# Patient Record
Sex: MALE | Race: WHITE | NOT HISPANIC OR LATINO | Employment: PART TIME | URBAN - NONMETROPOLITAN AREA
[De-identification: names, ages, dates, MRNs, and addresses within clinical notes are randomized per-mention and may not be internally consistent; named-entity substitution may affect disease eponyms.]

---

## 2017-05-09 RX ORDER — LANSOPRAZOLE 30 MG/1
15 CAPSULE, DELAYED RELEASE ORAL DAILY
COMMUNITY
End: 2020-02-24

## 2017-05-09 RX ORDER — VALSARTAN AND HYDROCHLOROTHIAZIDE 320; 25 MG/1; MG/1
1 TABLET, FILM COATED ORAL DAILY
Status: ON HOLD | COMMUNITY
End: 2020-03-24 | Stop reason: CLARIF

## 2017-05-12 ENCOUNTER — ANESTHESIA EVENT (OUTPATIENT)
Dept: GASTROENTEROLOGY | Facility: HOSPITAL | Age: 52
End: 2017-05-12

## 2017-05-12 ENCOUNTER — HOSPITAL ENCOUNTER (OUTPATIENT)
Facility: HOSPITAL | Age: 52
Setting detail: HOSPITAL OUTPATIENT SURGERY
Discharge: HOME | End: 2017-05-12
Attending: INTERNAL MEDICINE | Admitting: INTERNAL MEDICINE

## 2017-05-12 ENCOUNTER — ANESTHESIA (OUTPATIENT)
Dept: GASTROENTEROLOGY | Facility: HOSPITAL | Age: 52
End: 2017-05-12

## 2017-05-12 VITALS
RESPIRATION RATE: 18 BRPM | HEART RATE: 102 BPM | BODY MASS INDEX: 30.02 KG/M2 | OXYGEN SATURATION: 98 % | WEIGHT: 209.66 LBS | TEMPERATURE: 97.8 F | DIASTOLIC BLOOD PRESSURE: 88 MMHG | HEIGHT: 70 IN | SYSTOLIC BLOOD PRESSURE: 131 MMHG

## 2017-05-12 PROCEDURE — 25010000002 PROPOFOL 10 MG/ML EMULSION: Performed by: NURSE ANESTHETIST, CERTIFIED REGISTERED

## 2017-05-12 PROCEDURE — 25010000002 MIDAZOLAM PER 1 MG: Performed by: NURSE ANESTHETIST, CERTIFIED REGISTERED

## 2017-05-12 RX ORDER — PROPOFOL 10 MG/ML
VIAL (ML) INTRAVENOUS AS NEEDED
Status: DISCONTINUED | OUTPATIENT
Start: 2017-05-12 | End: 2017-05-12 | Stop reason: SURG

## 2017-05-12 RX ORDER — MIDAZOLAM HYDROCHLORIDE 1 MG/ML
INJECTION INTRAMUSCULAR; INTRAVENOUS AS NEEDED
Status: DISCONTINUED | OUTPATIENT
Start: 2017-05-12 | End: 2017-05-12 | Stop reason: SURG

## 2017-05-12 RX ORDER — COLCHICINE 0.6 MG/1
0.6 TABLET ORAL 3 TIMES DAILY PRN
COMMUNITY
End: 2020-02-24

## 2017-05-12 RX ORDER — DEXTROSE AND SODIUM CHLORIDE 5; .45 G/100ML; G/100ML
30 INJECTION, SOLUTION INTRAVENOUS CONTINUOUS
Status: DISCONTINUED | OUTPATIENT
Start: 2017-05-12 | End: 2017-05-12 | Stop reason: HOSPADM

## 2017-05-12 RX ADMIN — PROPOFOL 70 MG: 10 INJECTION, EMULSION INTRAVENOUS at 14:41

## 2017-05-12 RX ADMIN — PROPOFOL 40 MG: 10 INJECTION, EMULSION INTRAVENOUS at 14:43

## 2017-05-12 RX ADMIN — PROPOFOL 20 MG: 10 INJECTION, EMULSION INTRAVENOUS at 14:45

## 2017-05-12 RX ADMIN — DEXTROSE AND SODIUM CHLORIDE 30 ML/HR: 5; 450 INJECTION, SOLUTION INTRAVENOUS at 14:08

## 2017-05-12 RX ADMIN — MIDAZOLAM 2 MG: 1 INJECTION INTRAMUSCULAR; INTRAVENOUS at 14:42

## 2017-05-12 RX ADMIN — PROPOFOL 40 MG: 10 INJECTION, EMULSION INTRAVENOUS at 14:42

## 2017-05-12 RX ADMIN — PROPOFOL 30 MG: 10 INJECTION, EMULSION INTRAVENOUS at 14:47

## 2020-02-17 ENCOUNTER — TELEPHONE (OUTPATIENT)
Dept: CARDIOLOGY | Facility: CLINIC | Age: 55
End: 2020-02-17

## 2020-02-17 NOTE — TELEPHONE ENCOUNTER
Called patient to see if he had seen another cardiologist for medical record purposes, there was no answer

## 2020-02-24 ENCOUNTER — OFFICE VISIT (OUTPATIENT)
Dept: CARDIOLOGY | Facility: CLINIC | Age: 55
End: 2020-02-24

## 2020-02-24 VITALS
HEIGHT: 70 IN | OXYGEN SATURATION: 98 % | WEIGHT: 211.6 LBS | DIASTOLIC BLOOD PRESSURE: 78 MMHG | HEART RATE: 80 BPM | BODY MASS INDEX: 30.29 KG/M2 | SYSTOLIC BLOOD PRESSURE: 130 MMHG

## 2020-02-24 DIAGNOSIS — I10 HYPERTENSION, UNSPECIFIED TYPE: Primary | ICD-10-CM

## 2020-02-24 DIAGNOSIS — E78.2 HYPERLIPEMIA, MIXED: ICD-10-CM

## 2020-02-24 DIAGNOSIS — I10 HYPERTENSION, ESSENTIAL: ICD-10-CM

## 2020-02-24 DIAGNOSIS — R07.9 CHEST PAIN, UNSPECIFIED TYPE: Primary | ICD-10-CM

## 2020-02-24 PROCEDURE — 99204 OFFICE O/P NEW MOD 45 MIN: CPT | Performed by: INTERNAL MEDICINE

## 2020-02-24 PROCEDURE — 93000 ELECTROCARDIOGRAM COMPLETE: CPT | Performed by: INTERNAL MEDICINE

## 2020-02-24 RX ORDER — TIZANIDINE 4 MG/1
TABLET ORAL
COMMUNITY
Start: 2020-01-07

## 2020-02-24 RX ORDER — AMLODIPINE BESYLATE 5 MG/1
5 TABLET ORAL EVERY EVENING
COMMUNITY
Start: 2020-02-02

## 2020-02-24 RX ORDER — HYDROCHLOROTHIAZIDE 25 MG/1
25 TABLET ORAL DAILY
COMMUNITY
Start: 2019-12-26 | End: 2020-02-24

## 2020-02-24 RX ORDER — IRBESARTAN AND HYDROCHLOROTHIAZIDE 300; 12.5 MG/1; MG/1
1 TABLET, FILM COATED ORAL DAILY
COMMUNITY
Start: 2020-01-24 | End: 2020-02-24

## 2020-02-24 RX ORDER — LOSARTAN POTASSIUM 100 MG/1
100 TABLET ORAL DAILY
Status: ON HOLD | COMMUNITY
Start: 2019-11-23 | End: 2020-03-24 | Stop reason: CLARIF

## 2020-02-24 RX ORDER — ATORVASTATIN CALCIUM 20 MG/1
20 TABLET, FILM COATED ORAL DAILY
Qty: 30 TABLET | Refills: 11 | Status: SHIPPED | OUTPATIENT
Start: 2020-02-24 | End: 2020-07-22

## 2020-02-24 RX ORDER — MELOXICAM 7.5 MG/1
7.5 TABLET ORAL DAILY
COMMUNITY
Start: 2020-01-25 | End: 2021-11-24

## 2020-02-24 RX ORDER — ATORVASTATIN CALCIUM 10 MG/1
10 TABLET, FILM COATED ORAL EVERY EVENING
COMMUNITY
Start: 2020-02-22 | End: 2020-02-24

## 2020-02-24 NOTE — PROGRESS NOTES
[facility] Cardiology  OFFICE NOTE    Cardiovascular Medicine  Luz Maria Jones M.D., Zac Hernandez MD  [address] [place] [address]    Thank you for asking me to see Tyree Phelan for Chest pain.    History of Present Illness  This is a 54 y.o. male with:    1.  Hypertension  2.  Hyperlipidemia  3.  Active tobacco use  4.  Coronary artery calcification    Tyree Phelan is a 54 y.o. male who presents for consultation today.  Patient has multiple risk factors of hypertension, hyperlipidemia, active tobacco use, significant family history of premature coronary artery disease who recently had a lungs cancer screening CT done which showed calcification and plaque in his LAD and left circumflex and is here for further evaluation.  On further inquiry patient reported he has been having intermittent discomfort in the left side of his chest, he described it as pressure in character, usually happens at rest, he would last for several seconds and then goes away on its own.  Is happening about 2-3 times per week.  He is pretty active at baseline is working full-time and is on his feet climbing flights of stairs and he did not report any chest pain or shortness of breath with those activities though.  He does not have any personal history of coronary artery disease though but he has significant history of premature coronary artery disease in his siblings as well as his parents  He still smoking a pack and 1/2/day.  No other acute complaints  He is denying any orthopnea, palpitations, PND, lower extremity swelling.    Review of Systems - ROS  Constitution: Negative for weakness, weight gain and weight loss.   HENT: Negative for congestion.    Eyes: Negative for blurred vision.   Cardiovascular: As mentioned above  Respiratory: Negative for cough and hemoptysis.    Endocrine: Negative for polydipsia and polyuria.   Hematologic/Lymphatic: Negative for bleeding problem. Does  "not bruise/bleed easily.   Skin: Negative for flushing.   Musculoskeletal: Negative for neck pain and stiffness.   Gastrointestinal: Negative for abdominal pain, diarrhea, jaundice, melena, nausea and vomiting.   Genitourinary: Negative for dysuria and hematuria.   Neurological: Negative for dizziness, focal weakness and numbness.   Psychiatric/Behavioral: Negative for altered mental status and depression.          All other systems were reviewed and were negative.    family history includes Diabetes in his mother; Heart disease in his father.     reports that he has been smoking cigarettes. He has a 45.00 pack-year smoking history. He does not have any smokeless tobacco history on file. He reports that he drinks alcohol. Drug use questions deferred to the physician.    Allergies   Allergen Reactions   • Aspirin    • Codeine      itch         Current Outpatient Medications:   •  amLODIPine (NORVASC) 5 MG tablet, Take 5 mg by mouth Every Evening., Disp: , Rfl:   •  losartan (COZAAR) 100 MG tablet, Take 100 mg by mouth Daily., Disp: , Rfl:   •  meloxicam (MOBIC) 7.5 MG tablet, Take 7.5 mg by mouth Daily., Disp: , Rfl:   •  tiZANidine (ZANAFLEX) 4 MG tablet, TAKE 1 2 TO 1 (ONE HALF TO ONE) TABLET BY MOUTH NIGHTLY FOR 2 WEEKS MAX, Disp: , Rfl:   •  valsartan-hydrochlorothiazide (DIOVAN-HCT) 320-25 MG per tablet, Take 1 tablet by mouth Daily., Disp: , Rfl:   •  atorvastatin (LIPITOR) 20 MG tablet, Take 1 tablet by mouth Daily., Disp: 30 tablet, Rfl: 11    Physical Exam:  Vitals:    02/24/20 0958   BP: 130/78   BP Location: Left arm   Patient Position: Sitting   Cuff Size: Adult   Pulse: 80   SpO2: 98%   Weight: 96 kg (211 lb 9.6 oz)   Height: 176.5 cm (69.5\")   PainSc: 0-No pain     Current Pain Level: none  Pulse Ox: Normal  on room air  General: alert, appears stated age and cooperative     Body Habitus: well-nourished    HEENT: Head: Normocephalic, no lesions, without obvious abnormality. No arcus senilis, xanthelasma " or xanthomas.    Neuro: alert, oriented x3  Pulses: 2+ and symmetric  JVP: Volume/Pulsation: Normal.  Normal waveforms.   Appropriate inspiratory decrease.  No Kussmaul's. No Herb's.   Carotid Exam: no bruit normal pulsation bilaterally   Carotid Volume: normal.     Respirations: no increased work of breathing   Chest:  Normal    Pulmonary:Normal   Precordium: Normal impulses. P2 is not palpable.  RV Heave: absent  LV Heave: absent  [place]:  normal size and placement  Palpable S4: absent.  Heart rate: normal    Heart Rhythm: regular     Heart Sounds: S1: normal  S2: normal  S3: absent   S4: absent  Opening Snap: absent    Pericardial Rub:  Absent: .    Abdomen:   Appearance: normal .  Palpation: Soft, non-tender to palpation, bowel sounds positive in all four quadrants; no guarding or rebound tenderness  Extremity: no edema.   LE Skin: no rashes  LE Hair:  normal  LE Pulses: well perfused with normal pulses in the distal extremities  Pallor on elevation: Absent. Rubor on dependency: None      DATA REVIEWED:     EKG. I personally reviewed and interpreted the EKG.  Normal sinus rhythm, normal EKG.    ECG/EMG Results (all)     None        ---------------------------------------------------  TTE/SHARON:     --------------------------------------------------------------------------------------------------  LABS:     The 10-year CVD risk score (CHHAYA'Agoino, et al., 2008) is: 29%    Values used to calculate the score:      Age: 54 years      Sex: Male      Diabetic: No      Tobacco smoker: Yes      Systolic Blood Pressure: 130 mmHg      Is BP treated: Yes      HDL Cholesterol: 36 mg/dL      Total Cholesterol: 166 mg/dL         Lab Results   Component Value Date    BUN 16 04/23/2018    CREATININE 0.9 04/23/2018    K 4.2 04/23/2018    CALCIUM 9 04/23/2018    ALBUMIN 3.8 04/23/2018    AST 18 04/23/2018    ALT 35 04/23/2018     Lab Results   Component Value Date    WBC 9.2 04/24/2019    HGB 14.8 04/24/2019    HCT 43.6  04/24/2019    MCV 88 04/24/2019     04/24/2019     Lab Results   Component Value Date    CHOL 166 04/24/2019    TRIG 89 04/24/2019    HDL 36 04/24/2019     (H) 04/24/2019     Lab Results   Component Value Date    TSH 2.25 04/24/2019     No results found for: CKTOTAL, CKMB, CKMBINDEX, TROPONINI, TROPONINT  No results found for: HGBA1C  No results found for: DDIMER  Lab Results   Component Value Date    ALT 35 04/23/2018     No results found for: HGBA1C  Lab Results   Component Value Date    CREATININE 0.9 04/23/2018     No results found for: IRON, TIBC, FERRITIN  No results found for: INR, PROTIME    Assessment/Plan     1. Chest pain,:  His chest pain is rather atypical in character however his recent CT showed moderate calcification his LAD and left circumflex, he has risk factors of hypertension, hyperlipidemia, tobacco use as well as family history.  We will plan performing a 2-day exercise nuclear stress test.  I explained the risk, benefits, alternatives limitation of stress to the patient patient is agreeable.  I advised him to seek medical care for have significant chest pain not resolving.  - Stress Test With Myocardial Perfusion Two Day    2. Hyperlipemia:  With his ASCVD score he qualifies for high intensity statin.  I am uptitrating his Lipitor to 20 mg.    3. Hypertension, essential  Managed with primary care physician he is on a combination of valsartan/hydrochlorothiazide as well as losartan and amlodipine.  He is on 2 ARB at the same time, I would recommend stopping the losartan and potentially have him on a beta-blocker.    Prevention:  Patient's Body mass index is 30.8 kg/m². BMI is above normal parameters. Recommendations include: exercise counseling and nutrition counseling.      Tyree Phelan  reports that he has been smoking cigarettes. He has a 45.00 pack-year smoking history. He does not have any smokeless tobacco history on file.. I have educated him on the risk of diseases  from using tobacco products such as cancer, COPD and heart diease.     I advised him to quit and he is willing to quit. We have discussed the following method/s for tobacco cessation:  Education Material Counseling.  I spent 5 minutes counseling the patient.           AAA Screening:   Not needed      Return in about 4 months (around 6/24/2020).          This document has been electronically signed by Luz Maria Jones MD on February 24, 2020 10:44 AM

## 2020-03-10 ENCOUNTER — HOSPITAL ENCOUNTER (OUTPATIENT)
Dept: NUCLEAR MEDICINE | Facility: HOSPITAL | Age: 55
Discharge: HOME | End: 2020-03-10

## 2020-03-10 PROCEDURE — 93017 CV STRESS TEST TRACING ONLY: CPT

## 2020-03-10 PROCEDURE — 0 TECHNETIUM SESTAMIBI: Performed by: INTERNAL MEDICINE

## 2020-03-10 PROCEDURE — A9500 TC99M SESTAMIBI: HCPCS | Performed by: INTERNAL MEDICINE

## 2020-03-10 PROCEDURE — 78452 HT MUSCLE IMAGE SPECT MULT: CPT

## 2020-03-10 RX ADMIN — TECHNETIUM TC 99M SESTAMIBI 1 DOSE: 1 INJECTION INTRAVENOUS at 07:17

## 2020-03-11 ENCOUNTER — HOSPITAL ENCOUNTER (OUTPATIENT)
Dept: NUCLEAR MEDICINE | Facility: HOSPITAL | Age: 55
Discharge: HOME | End: 2020-03-11

## 2020-03-11 ENCOUNTER — HOSPITAL ENCOUNTER (OUTPATIENT)
Dept: CARDIOLOGY | Facility: HOSPITAL | Age: 55
Discharge: HOME | End: 2020-03-11

## 2020-03-11 PROCEDURE — 78452 HT MUSCLE IMAGE SPECT MULT: CPT | Performed by: INTERNAL MEDICINE

## 2020-03-11 PROCEDURE — 0 TECHNETIUM SESTAMIBI: Performed by: INTERNAL MEDICINE

## 2020-03-11 PROCEDURE — 93016 CV STRESS TEST SUPVJ ONLY: CPT | Performed by: INTERNAL MEDICINE

## 2020-03-11 PROCEDURE — 93018 CV STRESS TEST I&R ONLY: CPT | Performed by: INTERNAL MEDICINE

## 2020-03-11 PROCEDURE — A9500 TC99M SESTAMIBI: HCPCS | Performed by: INTERNAL MEDICINE

## 2020-03-11 RX ADMIN — TECHNETIUM TC 99M SESTAMIBI 1 DOSE: 1 INJECTION INTRAVENOUS at 08:21

## 2020-03-13 LAB
BH CV STRESS BP STAGE 1: NORMAL
BH CV STRESS BP STAGE 2: NORMAL
BH CV STRESS DURATION MIN STAGE 1: 3
BH CV STRESS DURATION MIN STAGE 2: 3
BH CV STRESS DURATION MIN STAGE 3: 2
BH CV STRESS DURATION SEC STAGE 1: 0
BH CV STRESS DURATION SEC STAGE 2: 0
BH CV STRESS DURATION SEC STAGE 3: 0
BH CV STRESS GRADE STAGE 1: 10
BH CV STRESS GRADE STAGE 2: 12
BH CV STRESS GRADE STAGE 3: 14
BH CV STRESS HR STAGE 1: 94
BH CV STRESS HR STAGE 2: 123
BH CV STRESS HR STAGE 3: 146
BH CV STRESS METS STAGE 1: 5
BH CV STRESS METS STAGE 2: 7.5
BH CV STRESS METS STAGE 3: 10
BH CV STRESS PROTOCOL 1: NORMAL
BH CV STRESS RECOVERY BP: NORMAL MMHG
BH CV STRESS RECOVERY HR: 80 BPM
BH CV STRESS SPEED STAGE 1: 1.7
BH CV STRESS SPEED STAGE 2: 2.5
BH CV STRESS SPEED STAGE 3: 3.4
BH CV STRESS STAGE 1: 1
BH CV STRESS STAGE 2: 2
BH CV STRESS STAGE 3: 3
LV EF NUC BP: 61 %
MAXIMAL PREDICTED HEART RATE: 165 BPM
PERCENT MAX PREDICTED HR: 88.48 %
STRESS BASELINE BP: NORMAL MMHG
STRESS BASELINE HR: 67 BPM
STRESS PERCENT HR: 104 %
STRESS POST ESTIMATED WORKLOAD: 10 METS
STRESS POST EXERCISE DUR MIN: 8 MIN
STRESS POST PEAK BP: NORMAL MMHG
STRESS POST PEAK HR: 146 BPM
STRESS TARGET HR: 140 BPM

## 2020-03-17 DIAGNOSIS — Z72.0 TOBACCO USE: ICD-10-CM

## 2020-03-17 DIAGNOSIS — R07.9 CHEST PAIN, UNSPECIFIED TYPE: Primary | ICD-10-CM

## 2020-03-17 DIAGNOSIS — R94.39 ABNORMAL NUCLEAR STRESS TEST: ICD-10-CM

## 2020-03-17 DIAGNOSIS — E78.2 HYPERLIPEMIA, MIXED: ICD-10-CM

## 2020-03-17 DIAGNOSIS — I10 HYPERTENSION, ESSENTIAL: ICD-10-CM

## 2020-03-17 RX ORDER — ASPIRIN 81 MG/1
325 TABLET ORAL DAILY
Status: CANCELLED | OUTPATIENT
Start: 2020-03-18

## 2020-03-17 RX ORDER — ASPIRIN 325 MG
325 TABLET ORAL ONCE
Status: CANCELLED | OUTPATIENT
Start: 2020-03-17 | End: 2020-03-17

## 2020-03-17 RX ORDER — METOPROLOL SUCCINATE 25 MG/1
25 TABLET, EXTENDED RELEASE ORAL DAILY
Qty: 90 TABLET | Refills: 1 | Status: SHIPPED | OUTPATIENT
Start: 2020-03-17 | End: 2020-03-24 | Stop reason: HOSPADM

## 2020-03-17 RX ORDER — SODIUM CHLORIDE 9 MG/ML
100 INJECTION, SOLUTION INTRAVENOUS CONTINUOUS
Status: CANCELLED | OUTPATIENT
Start: 2020-03-24

## 2020-03-17 NOTE — PROGRESS NOTES
Stress test results discussed with patient. Small area of possible sever ischemia noted. Patient does have chest pressure and sob. Dr Jones recommends going forward with heart cath. Heart cath explained to patient. Patient wants to proceed. Date and instructions given to patient. Metoprolol succinate 25mg sent in per order. Patient verbalized understanding.

## 2020-03-24 ENCOUNTER — HOSPITAL ENCOUNTER (OUTPATIENT)
Facility: HOSPITAL | Age: 55
Setting detail: HOSPITAL OUTPATIENT SURGERY
Discharge: HOME | End: 2020-03-24
Attending: INTERNAL MEDICINE | Admitting: INTERNAL MEDICINE

## 2020-03-24 VITALS
BODY MASS INDEX: 30.5 KG/M2 | HEART RATE: 60 BPM | OXYGEN SATURATION: 97 % | DIASTOLIC BLOOD PRESSURE: 76 MMHG | SYSTOLIC BLOOD PRESSURE: 127 MMHG | HEIGHT: 69 IN | RESPIRATION RATE: 18 BRPM | TEMPERATURE: 97 F | WEIGHT: 205.91 LBS

## 2020-03-24 DIAGNOSIS — R94.39 ABNORMAL NUCLEAR STRESS TEST: ICD-10-CM

## 2020-03-24 DIAGNOSIS — Z72.0 TOBACCO USE: ICD-10-CM

## 2020-03-24 DIAGNOSIS — E78.2 HYPERLIPEMIA, MIXED: ICD-10-CM

## 2020-03-24 DIAGNOSIS — R07.9 CHEST PAIN, UNSPECIFIED TYPE: ICD-10-CM

## 2020-03-24 DIAGNOSIS — I10 HYPERTENSION, ESSENTIAL: ICD-10-CM

## 2020-03-24 LAB
ANION GAP SERPL CALCULATED.3IONS-SCNC: 14 MMOL/L (ref 5–15)
BUN BLD-MCNC: 21 MG/DL (ref 6–20)
BUN/CREAT SERPL: 30.4 (ref 7–25)
CALCIUM SPEC-SCNC: 9 MG/DL (ref 8.6–10.5)
CHLORIDE SERPL-SCNC: 103 MMOL/L (ref 98–107)
CO2 SERPL-SCNC: 21 MMOL/L (ref 22–29)
CREAT BLD-MCNC: 0.69 MG/DL (ref 0.76–1.27)
DEPRECATED RDW RBC AUTO: 38.5 FL (ref 37–54)
ERYTHROCYTE [DISTWIDTH] IN BLOOD BY AUTOMATED COUNT: 12.4 % (ref 12.3–15.4)
GFR SERPL CREATININE-BSD FRML MDRD: 119 ML/MIN/1.73
GLUCOSE BLD-MCNC: 103 MG/DL (ref 65–99)
HCT VFR BLD AUTO: 44.1 % (ref 37.5–51)
HGB BLD-MCNC: 15.1 G/DL (ref 13–17.7)
INR PPP: 1.05 (ref 0.8–1.2)
MCH RBC QN AUTO: 29.3 PG (ref 26.6–33)
MCHC RBC AUTO-ENTMCNC: 34.2 G/DL (ref 31.5–35.7)
MCV RBC AUTO: 85.6 FL (ref 79–97)
PLATELET # BLD AUTO: 239 10*3/MM3 (ref 140–450)
PMV BLD AUTO: 9.2 FL (ref 6–12)
POTASSIUM BLD-SCNC: 4.2 MMOL/L (ref 3.5–5.2)
PROTHROMBIN TIME: 13.5 SECONDS (ref 11.1–15.3)
RBC # BLD AUTO: 5.15 10*6/MM3 (ref 4.14–5.8)
SODIUM BLD-SCNC: 138 MMOL/L (ref 136–145)
WBC NRBC COR # BLD: 7.86 10*3/MM3 (ref 3.4–10.8)

## 2020-03-24 PROCEDURE — 25010000002 MIDAZOLAM PER 1 MG: Performed by: INTERNAL MEDICINE

## 2020-03-24 PROCEDURE — 93458 L HRT ARTERY/VENTRICLE ANGIO: CPT | Performed by: INTERNAL MEDICINE

## 2020-03-24 PROCEDURE — 0 IOPAMIDOL PER 1 ML: Performed by: INTERNAL MEDICINE

## 2020-03-24 PROCEDURE — 25010000002 FENTANYL CITRATE (PF) 100 MCG/2ML SOLUTION: Performed by: INTERNAL MEDICINE

## 2020-03-24 PROCEDURE — C1769 GUIDE WIRE: HCPCS | Performed by: INTERNAL MEDICINE

## 2020-03-24 PROCEDURE — 25010000002 HEPARIN (PORCINE) PER 1000 UNITS: Performed by: INTERNAL MEDICINE

## 2020-03-24 PROCEDURE — 80048 BASIC METABOLIC PNL TOTAL CA: CPT | Performed by: INTERNAL MEDICINE

## 2020-03-24 PROCEDURE — C1894 INTRO/SHEATH, NON-LASER: HCPCS | Performed by: INTERNAL MEDICINE

## 2020-03-24 PROCEDURE — 85610 PROTHROMBIN TIME: CPT | Performed by: INTERNAL MEDICINE

## 2020-03-24 PROCEDURE — 85027 COMPLETE CBC AUTOMATED: CPT | Performed by: INTERNAL MEDICINE

## 2020-03-24 RX ORDER — SODIUM CHLORIDE 9 MG/ML
250 INJECTION, SOLUTION INTRAVENOUS CONTINUOUS
Status: DISCONTINUED | OUTPATIENT
Start: 2020-03-24 | End: 2020-03-24 | Stop reason: HOSPADM

## 2020-03-24 RX ORDER — LANSOPRAZOLE 30 MG/1
30 CAPSULE, DELAYED RELEASE ORAL DAILY
COMMUNITY

## 2020-03-24 RX ORDER — FENTANYL CITRATE 50 UG/ML
INJECTION, SOLUTION INTRAMUSCULAR; INTRAVENOUS AS NEEDED
Status: DISCONTINUED | OUTPATIENT
Start: 2020-03-24 | End: 2020-03-24 | Stop reason: HOSPADM

## 2020-03-24 RX ORDER — IRBESARTAN AND HYDROCHLOROTHIAZIDE 300; 12.5 MG/1; MG/1
1 TABLET, FILM COATED ORAL DAILY
COMMUNITY
End: 2021-11-24 | Stop reason: ALTCHOICE

## 2020-03-24 RX ORDER — LIDOCAINE HYDROCHLORIDE 20 MG/ML
INJECTION, SOLUTION INFILTRATION; PERINEURAL AS NEEDED
Status: DISCONTINUED | OUTPATIENT
Start: 2020-03-24 | End: 2020-03-24 | Stop reason: HOSPADM

## 2020-03-24 RX ORDER — SODIUM CHLORIDE 9 MG/ML
100 INJECTION, SOLUTION INTRAVENOUS CONTINUOUS
Status: DISCONTINUED | OUTPATIENT
Start: 2020-03-24 | End: 2020-03-24 | Stop reason: HOSPADM

## 2020-03-24 RX ORDER — MIDAZOLAM HYDROCHLORIDE 1 MG/ML
INJECTION INTRAMUSCULAR; INTRAVENOUS AS NEEDED
Status: DISCONTINUED | OUTPATIENT
Start: 2020-03-24 | End: 2020-03-24 | Stop reason: HOSPADM

## 2020-03-24 RX ORDER — HEPARIN SODIUM 1000 [USP'U]/ML
INJECTION, SOLUTION INTRAVENOUS; SUBCUTANEOUS AS NEEDED
Status: DISCONTINUED | OUTPATIENT
Start: 2020-03-24 | End: 2020-03-24 | Stop reason: HOSPADM

## 2020-03-24 RX ORDER — ASPIRIN 325 MG
325 TABLET ORAL ONCE
Status: COMPLETED | OUTPATIENT
Start: 2020-03-24 | End: 2020-03-24

## 2020-03-24 RX ADMIN — SODIUM CHLORIDE 100 ML/HR: 9 INJECTION, SOLUTION INTRAVENOUS at 07:03

## 2020-03-24 RX ADMIN — SODIUM CHLORIDE 250 ML/HR: 9 INJECTION, SOLUTION INTRAVENOUS at 10:12

## 2020-03-24 RX ADMIN — ASPIRIN 325 MG: 325 TABLET ORAL at 07:03

## 2020-03-24 NOTE — INTERVAL H&P NOTE
[facility] Pre-Op:    Invasive coronary angiography was recommended to the patient.  The patientdenies  bleeding issues. The patient denies  use of antiplatelet agents.  The patient denies  CKD. The patient denies  contrast allergy. The patient denies  use of diabetes medications.    Pre-Cath Surgical History: None    The indications, risks/benefits and alternatives of diagnostic left heart cardiac catheterization, angiography, conscious sedation, and possible blood transfusion were discussed in detail with the patient. The potential complications of 1/2000 chance of death, 1/1000 chance of heart attack or stroke, 1/500 chance of bleeding or clotting of the femoral artery, and 1/500 chance of allergic reaction to contrast were discussed. We also reviewed possible complications of infection and kidney dysfunction. If PCI were performed and intra-coronary stents indicated, we discussed the details about TOMI. This included a review of the risks of the infrequent, but relatively higher incidence of late thrombosis with TOMI. The importance of maintaining a consistent daily regimen of aspirin and an additional anti-platelet agent for as long as directed after implantation was emphasized. No contraindications were found. The patient  appeared to understand and agreed to the above.  -Left heart catheterization, Coronary angiography, Graft angiography, In-situ LIMA angiography, Left ventriculography, Intravascular ultrasound, Optical coherence tomography, Flow wire, Balloon Angioplasty, Coronary stent, Graft stent, Aortography, Iliofemoral angiography, Device closure, femoral artery, Intra-aortic balloon pump and Impella LV assist device implantation    ASA Class: II  Mallampati Score: II    Recommended contrast: 238  Maximum contrast: 357    Contraindications to DAPT: None      H&P reviewed. The patient was examined and there are no changes to the H&P.

## 2020-03-24 NOTE — DISCHARGE INSTRUCTIONS
Radial Site Care    This sheet gives you information about how to care for yourself after your procedure. Your health care provider may also give you more specific instructions. If you have problems or questions, contact your health care provider.  What can I expect after the procedure?  After the procedure, it is common to have:  · Bruising and tenderness at the catheter insertion area.  Follow these instructions at home:  Medicines  · Take over-the-counter and prescription medicines only as told by your health care provider.  Insertion site care  · Follow instructions from your health care provider about how to take care of your insertion site. Make sure you:  ? Wash your hands with soap and water before you change your bandage (dressing). If soap and water are not available, use hand .  ? Change your dressing as told by your health care provider.  ? Leave stitches (sutures), skin glue, or adhesive strips in place. These skin closures may need to stay in place for 2 weeks or longer. If adhesive strip edges start to loosen and curl up, you may trim the loose edges. Do not remove adhesive strips completely unless your health care provider tells you to do that.  · Check your insertion site every day for signs of infection. Check for:  ? Redness, swelling, or pain.  ? Fluid or blood.  ? Pus or a bad smell.  ? Warmth.  · Do not take baths, swim, or use a hot tub until your health care provider approves.  · You may shower 24-48 hours after the procedure, or as directed by your health care provider.  ? Remove the dressing and gently wash the site with plain soap and water.  ? Pat the area dry with a clean towel.  ? Do not rub the site. That could cause bleeding.  · Do not apply powder or lotion to the site.  Activity    · For 24 hours after the procedure, or as directed by your health care provider:  ? Do not flex or bend the affected arm.  ? Do not push or pull heavy objects with the affected arm.  ? Do not  drive yourself home from the hospital or clinic. You may drive 24 hours after the procedure unless your health care provider tells you not to.  ? Do not operate machinery or power tools.  · Do not lift anything that is heavier than 10 lb (4.5 kg), or the limit that you are told, until your health care provider says that it is safe.  · Ask your health care provider when it is okay to:  ? Return to work or school.  ? Resume usual physical activities or sports.  ? Resume sexual activity.  General instructions  · If the catheter site starts to bleed, raise your arm and put firm pressure on the site. If the bleeding does not stop, get help right away. This is a medical emergency.  · If you went home on the same day as your procedure, a responsible adult should be with you for the first 24 hours after you arrive home.  · Keep all follow-up visits as told by your health care provider. This is important.  Contact a health care provider if:  · You have a fever.  · You have redness, swelling, or yellow drainage around your insertion site.  Get help right away if:  · You have unusual pain at the radial site.  · The catheter insertion area swells very fast.  · The insertion area is bleeding, and the bleeding does not stop when you hold steady pressure on the area.  · Your arm or hand becomes pale, cool, tingly, or numb.  These symptoms may represent a serious problem that is an emergency. Do not wait to see if the symptoms will go away. Get medical help right away. Call your local emergency services (911 in the [place] Do not drive yourself to the hospital.  Summary  · After the procedure, it is common to have bruising and tenderness at the site.  · Follow instructions from your health care provider about how to take care of your radial site wound. Check the wound every day for signs of infection.  · Do not lift anything that is heavier than 10 lb (4.5 kg), or the limit that you are told, until your health care provider says  that it is safe.  This information is not intended to replace advice given to you by your health care provider. Make sure you discuss any questions you have with your health care provider.  Document Released: 01/20/2012 Document Revised: 01/23/2019 Document Reviewed: 01/23/2019  Elsevier Interactive Patient Education © 2020 [employer]

## 2020-07-22 ENCOUNTER — OFFICE VISIT (OUTPATIENT)
Dept: CARDIOLOGY | Facility: CLINIC | Age: 55
End: 2020-07-22

## 2020-07-22 VITALS
DIASTOLIC BLOOD PRESSURE: 70 MMHG | HEIGHT: 69 IN | BODY MASS INDEX: 29.33 KG/M2 | SYSTOLIC BLOOD PRESSURE: 110 MMHG | HEART RATE: 79 BPM | OXYGEN SATURATION: 99 % | WEIGHT: 198 LBS

## 2020-07-22 DIAGNOSIS — E78.2 HYPERLIPEMIA, MIXED: Primary | ICD-10-CM

## 2020-07-22 DIAGNOSIS — I10 HYPERTENSION, ESSENTIAL: ICD-10-CM

## 2020-07-22 PROCEDURE — 99214 OFFICE O/P EST MOD 30 MIN: CPT | Performed by: INTERNAL MEDICINE

## 2020-07-22 RX ORDER — ATORVASTATIN CALCIUM 40 MG/1
40 TABLET, FILM COATED ORAL DAILY
Qty: 90 TABLET | Refills: 3 | Status: SHIPPED | OUTPATIENT
Start: 2020-07-22 | End: 2021-11-24

## 2020-07-22 RX ORDER — SILDENAFIL 100 MG/1
100 TABLET, FILM COATED ORAL AS NEEDED
COMMUNITY
Start: 2020-05-14

## 2020-07-22 NOTE — PROGRESS NOTES
[facility] Cardiology  OFFICE NOTE    Cardiovascular Medicine  Luz Maria Jones M.D., RPVI         No referring provider defined for this encounter.    Thank you for asking me to see Tyree Phelan for Chest pain.    History of Present Illness  This is a 54 y.o. male with:    1.  Hypertension  2.  Hyperlipidemia  3.  Active tobacco use  4.  Coronary artery calcification    Tyree Phelan is a 54 y.o. male who presents for consultation today.  Patient has multiple risk factors of hypertension, hyperlipidemia, active tobacco use, significant family history of premature coronary artery disease who recently had a lungs cancer screening CT done which showed calcification and plaque in his LAD and left circumflex and is here for further evaluation.  On further inquiry patient reported he has been having intermittent discomfort in the left side of his chest, he described it as pressure in character, usually happens at rest, he would last for several seconds and then goes away on its own.  Is happening about 2-3 times per week.  He is pretty active at baseline is working full-time and is on his feet climbing flights of stairs and he did not report any chest pain or shortness of breath with those activities though.  He does not have any personal history of coronary artery disease though but he has significant history of premature coronary artery disease in his siblings as well as his parents  He still smoking a pack and 1/2/day.  No other acute complaints  He is denying any orthopnea, palpitations, PND, lower extremity swelling.    07/22/2020:  Since last visit patient underwent a nuclear stress test was concerning for small ischemia inferior wall, subsequent cardiac cath was without significant obstructive coronary artery disease.    He has done well since last visit.  No further episodes of chest pain.  He continues to smoke though.        Review of Systems - ROS  Constitution: Negative for weakness,  weight gain and weight loss.   HENT: Negative for congestion.    Eyes: Negative for blurred vision.   Cardiovascular: As mentioned above  Respiratory: Negative for cough and hemoptysis.    Endocrine: Negative for polydipsia and polyuria.   Hematologic/Lymphatic: Negative for bleeding problem. Does not bruise/bleed easily.   Skin: Negative for flushing.   Musculoskeletal: Negative for neck pain and stiffness.   Gastrointestinal: Negative for abdominal pain, diarrhea, jaundice, melena, nausea and vomiting.   Genitourinary: Negative for dysuria and hematuria.   Neurological: Negative for dizziness, focal weakness and numbness.   Psychiatric/Behavioral: Negative for altered mental status and depression.          All other systems were reviewed and were negative.    family history includes Diabetes in his mother; Heart disease in his father.     reports that he has been smoking cigarettes. He has a 45.00 pack-year smoking history. He has never used smokeless tobacco. He reports that he drank alcohol. He reports that he does not use drugs.    Allergies   Allergen Reactions   • Codeine Itching     itch         Current Outpatient Medications:   •  amLODIPine (NORVASC) 5 MG tablet, Take 5 mg by mouth Every Evening., Disp: , Rfl:   •  atorvastatin (LIPITOR) 20 MG tablet, Take 1 tablet by mouth Daily., Disp: 30 tablet, Rfl: 11  •  irbesartan-hydrochlorothiazide (AVALIDE) 300-12.5 MG tablet, Take 1 tablet by mouth Daily., Disp: , Rfl:   •  lansoprazole (PREVACID) 30 MG capsule, Take 30 mg by mouth Daily., Disp: , Rfl:   •  meloxicam (MOBIC) 7.5 MG tablet, Take 7.5 mg by mouth Daily., Disp: , Rfl:   •  sildenafil (VIAGRA) 100 MG tablet, Take 100 mg by mouth As Needed (as needed for gout)., Disp: , Rfl:   •  tiZANidine (ZANAFLEX) 4 MG tablet, TAKE 1 2 TO 1 (ONE HALF TO ONE) TABLET BY MOUTH NIGHTLY FOR 2 WEEKS MAX, Disp: , Rfl:     Physical Exam:  Vitals:    07/22/20 0825   BP: 110/70   BP Location: Left arm   Patient Position:  "Sitting   Cuff Size: Adult   Pulse: 79   SpO2: 99%   Weight: 89.8 kg (198 lb)   Height: 175.3 cm (69\")     Current Pain Level: none  Pulse Ox: Normal  on room air  General: alert, appears stated age and cooperative     Body Habitus: well-nourished    HEENT: Head: Normocephalic, no lesions, without obvious abnormality. No arcus senilis, xanthelasma or xanthomas.    Neuro: alert, oriented x3  Pulses: 2+ and symmetric  JVP: Volume/Pulsation: Normal.  Normal waveforms.   Appropriate inspiratory decrease.  No Kussmaul's. No Herb's.   Carotid Exam: no bruit normal pulsation bilaterally   Carotid Volume: normal.     Respirations: no increased work of breathing   Chest:  Normal    Pulmonary:Normal   Precordium: Normal impulses. P2 is not palpable.  RV Heave: absent  LV Heave: absent  [place]:  normal size and placement  Palpable S4: absent.  Heart rate: normal    Heart Rhythm: regular     Heart Sounds: S1: normal  S2: normal  S3: absent   S4: absent  Opening Snap: absent    Pericardial Rub:  Absent: .    Abdomen:   Appearance: normal .  Palpation: Soft, non-tender to palpation, bowel sounds positive in all four quadrants; no guarding or rebound tenderness  Extremity: no edema.   LE Skin: no rashes  LE Hair:  normal  LE Pulses: well perfused with normal pulses in the distal extremities  Pallor on elevation: Absent. Rubor on dependency: None      DATA REVIEWED:     EKG. I personally reviewed and interpreted the EKG.  Normal sinus rhythm, normal EKG.    ECG/EMG Results (all)     None        ---------------------------------------------------  TTE/SHARON:     --------------------------------------------------------------------------------------------------  LABS:     The 10-year CVD risk score (D'Agostino, et al., 2008) is: 29%    Values used to calculate the score:      Age: 54 years      Sex: Male      Diabetic: No      Tobacco smoker: Yes      Systolic Blood Pressure: 130 mmHg      Is BP treated: Yes      HDL Cholesterol: 36 " mg/dL      Total Cholesterol: 166 mg/dL         Lab Results   Component Value Date    GLUCOSE 103 (H) 03/24/2020    BUN 21 (H) 03/24/2020    CREATININE 0.69 (L) 03/24/2020    EGFRIFNONA 119 03/24/2020    BCR 30.4 (H) 03/24/2020    K 4.2 03/24/2020    CO2 21.0 (L) 03/24/2020    CALCIUM 9.0 03/24/2020    ALBUMIN 3.8 04/23/2018    AST 18 04/23/2018    ALT 35 04/23/2018     Lab Results   Component Value Date    WBC 7.86 03/24/2020    HGB 15.1 03/24/2020    HCT 44.1 03/24/2020    MCV 85.6 03/24/2020     03/24/2020     Lab Results   Component Value Date    CHOL 166 04/24/2019    TRIG 89 04/24/2019    HDL 36 04/24/2019     (H) 04/24/2019     Lab Results   Component Value Date    TSH 2.25 04/24/2019     No results found for: CKTOTAL, CKMB, CKMBINDEX, TROPONINI, TROPONINT  No results found for: HGBA1C  No results found for: DDIMER  Lab Results   Component Value Date    ALT 35 04/23/2018     No results found for: HGBA1C  Lab Results   Component Value Date    CREATININE 0.69 (L) 03/24/2020     No results found for: IRON, TIBC, FERRITIN  Lab Results   Component Value Date    INR 1.05 03/24/2020    PROTIME 13.5 03/24/2020       Assessment/Plan     1. Chest pain,: Resolved  His chest pain is rather atypical in character however his recent CT showed moderate calcification his LAD and left circumflex, he has risk factors of hypertension, hyperlipidemia, tobacco use as well as family history.  Stress test was with a small ischemia, subsequent cardiac cath with nonobstructive disease.  Continue risk factor modification for prior prevention.    2. Hyperlipemia:  With his ASCVD score he qualifies for high intensity statin.  I uptitrated his Lipitor to 20 mg on last visit.  He is following with his primary care physician for blood work next week.  Would recommend uptitrating Lipitor to 40 mg.    3. Hypertension, essential  Well-controlled on amlodipine 5 mg, irbesartan/hydrochlorothiazide.    Prevention:  Patient's Body  mass index is 29.24 kg/m². BMI is above normal parameters. Recommendations include: exercise counseling and nutrition counseling.      Tyree Phelan  reports that he has been smoking cigarettes. He has a 45.00 pack-year smoking history. He has never used smokeless tobacco.. I have educated him on the risk of diseases from using tobacco products such as cancer, COPD and heart diease.     I advised him to quit and he is willing to quit. We have discussed the following method/s for tobacco cessation:  Education Material Counseling.  I spent 5 minutes counseling the patient.           AAA Screening:   Not needed      No follow-ups on file.          This document has been electronically signed by Luz Maria Jones MD on July 22, 2020 08:53

## 2021-04-26 ENCOUNTER — IMMUNIZATION (OUTPATIENT)
Dept: VACCINE CLINIC | Facility: HOSPITAL | Age: 56
End: 2021-04-26

## 2021-04-26 PROCEDURE — 0001A: CPT | Performed by: THORACIC SURGERY (CARDIOTHORACIC VASCULAR SURGERY)

## 2021-04-26 PROCEDURE — 91300 HC SARSCOV02 VAC 30MCG/0.3ML IM: CPT | Performed by: THORACIC SURGERY (CARDIOTHORACIC VASCULAR SURGERY)

## 2021-05-19 ENCOUNTER — IMMUNIZATION (OUTPATIENT)
Dept: VACCINE CLINIC | Facility: HOSPITAL | Age: 56
End: 2021-05-19

## 2021-05-19 PROCEDURE — 0002A: CPT | Performed by: THORACIC SURGERY (CARDIOTHORACIC VASCULAR SURGERY)

## 2021-05-19 PROCEDURE — 91300 HC SARSCOV02 VAC 30MCG/0.3ML IM: CPT | Performed by: THORACIC SURGERY (CARDIOTHORACIC VASCULAR SURGERY)

## 2021-11-24 ENCOUNTER — OFFICE VISIT (OUTPATIENT)
Dept: CARDIOLOGY | Facility: CLINIC | Age: 56
End: 2021-11-24

## 2021-11-24 VITALS
HEIGHT: 69 IN | SYSTOLIC BLOOD PRESSURE: 128 MMHG | OXYGEN SATURATION: 98 % | DIASTOLIC BLOOD PRESSURE: 78 MMHG | TEMPERATURE: 96.6 F | HEART RATE: 74 BPM | WEIGHT: 176.4 LBS | BODY MASS INDEX: 26.13 KG/M2

## 2021-11-24 DIAGNOSIS — I25.10 CORONARY ARTERY DISEASE INVOLVING NATIVE CORONARY ARTERY OF NATIVE HEART WITHOUT ANGINA PECTORIS: Primary | ICD-10-CM

## 2021-11-24 LAB
QT INTERVAL: 368 MS
QTC INTERVAL: 408 MS

## 2021-11-24 PROCEDURE — 99214 OFFICE O/P EST MOD 30 MIN: CPT | Performed by: INTERNAL MEDICINE

## 2021-11-24 PROCEDURE — 93000 ELECTROCARDIOGRAM COMPLETE: CPT | Performed by: INTERNAL MEDICINE

## 2021-11-24 RX ORDER — ATORVASTATIN CALCIUM 40 MG/1
40 TABLET, FILM COATED ORAL DAILY
Qty: 90 TABLET | Refills: 3 | Status: SHIPPED | OUTPATIENT
Start: 2021-11-24

## 2021-11-24 RX ORDER — LOSARTAN POTASSIUM AND HYDROCHLOROTHIAZIDE 12.5; 1 MG/1; MG/1
TABLET ORAL DAILY
COMMUNITY
Start: 2021-10-28 | End: 2022-11-18 | Stop reason: ALTCHOICE

## 2021-11-24 NOTE — PROGRESS NOTES
[facility]  OFFICE NOTE    Cardiovascular Medicine  Luz Maria Jones M.D., RPVI         No referring provider defined for this encounter.    Thank you for asking me to see Tyree Phelan for Chest pain.    History of Present Illness  This is a 56 y.o. male with:    1.  Hypertension  2.  Hyperlipidemia  3.  Active tobacco use  4.  Coronary artery calcification    Tyree Phelan is a 56 y.o. male who presents for consultation today.  Patient has multiple risk factors of hypertension, hyperlipidemia, active tobacco use, significant family history of premature coronary artery disease who recently had a lungs cancer screening CT done which showed calcification and plaque in his LAD and left circumflex and is here for further evaluation.  On further inquiry patient reported he has been having intermittent discomfort in the left side of his chest, he described it as pressure in character, usually happens at rest, he would last for several seconds and then goes away on its own.  Is happening about 2-3 times per week.  He is pretty active at baseline is working full-time and is on his feet climbing flights of stairs and he did not report any chest pain or shortness of breath with those activities though.  He does not have any personal history of coronary artery disease though but he has significant history of premature coronary artery disease in his siblings as well as his parents  He still smoking a pack and 1/2/day.  No other acute complaints  He is denying any orthopnea, palpitations, PND, lower extremity swelling.    07/22/2020:  Since last visit patient underwent a nuclear stress test was concerning for small ischemia inferior wall, subsequent cardiac cath was without significant obstructive coronary artery disease.  He has done well since last visit.  No further episodes of chest pain.  He continues to smoke though.    No acute issues since last year.  Continues to smoke.  Denying any  chest pain.  Wife is diagnosed with lung cancer and unfortunately has his daughter .        Review of Systems - ROS  Constitution: Negative for weakness, weight gain and weight loss.   HENT: Negative for congestion.    Eyes: Negative for blurred vision.   Cardiovascular: As mentioned above  Respiratory: Negative for cough and hemoptysis.    Endocrine: Negative for polydipsia and polyuria.   Hematologic/Lymphatic: Negative for bleeding problem. Does not bruise/bleed easily.   Skin: Negative for flushing.   Musculoskeletal: Negative for neck pain and stiffness.   Gastrointestinal: Negative for abdominal pain, diarrhea, jaundice, melena, nausea and vomiting.   Genitourinary: Negative for dysuria and hematuria.   Neurological: Negative for dizziness, focal weakness and numbness.   Psychiatric/Behavioral: Negative for altered mental status and depression.          All other systems were reviewed and were negative.    family history includes Diabetes in his mother; Heart disease in his father.     reports that he has been smoking cigarettes. He has a 45.00 pack-year smoking history. He has never used smokeless tobacco. He reports previous alcohol use. He reports that he does not use drugs.    Allergies   Allergen Reactions   • Codeine Itching     itch         Current Outpatient Medications:   •  amLODIPine (NORVASC) 5 MG tablet, Take 5 mg by mouth Every Evening., Disp: , Rfl:   •  atorvastatin (LIPITOR) 40 MG tablet, Take 1 tablet by mouth Daily. (Patient taking differently: Take 10 mg by mouth Daily.), Disp: 90 tablet, Rfl: 3  •  lansoprazole (PREVACID) 30 MG capsule, Take 30 mg by mouth Daily., Disp: , Rfl:   •  losartan-hydrochlorothiazide (HYZAAR) 100-12.5 MG per tablet, Daily., Disp: , Rfl:   •  sildenafil (VIAGRA) 100 MG tablet, Take 100 mg by mouth As Needed (as needed for gout)., Disp: , Rfl:   •  tiZANidine (ZANAFLEX) 4 MG tablet, TAKE 1 2 TO 1 (ONE HALF TO ONE) TABLET BY MOUTH NIGHTLY FOR 2 WEEKS MAX, Disp:  ", Rfl:     Physical Exam:  Vitals:    11/24/21 1100   BP: 128/78   BP Location: Left arm   Patient Position: Sitting   Cuff Size: Adult   Pulse: 74   Temp: 96.6 °F (35.9 °C)   SpO2: 98%   Weight: 80 kg (176 lb 6.4 oz)   Height: 175.3 cm (69\")   PainSc: 0-No pain     Current Pain Level: none  Pulse Ox: Normal  on room air  General: alert, appears stated age and cooperative     Body Habitus: well-nourished    HEENT: Head: Normocephalic, no lesions, without obvious abnormality. No arcus senilis, xanthelasma or xanthomas.    Neuro: alert, oriented x3  Pulses: 2+ and symmetric  JVP: Volume/Pulsation: Normal.  Normal waveforms.   Appropriate inspiratory decrease.  No Kussmaul's. No Herb's.   Carotid Exam: no bruit normal pulsation bilaterally   Carotid Volume: normal.     Respirations: no increased work of breathing   Chest:  Normal    Pulmonary:Normal   Precordium: Normal impulses. P2 is not palpable.  RV Heave: absent  LV Heave: absent  [place]:  normal size and placement  Palpable S4: absent.  Heart rate: normal    Heart Rhythm: regular     Heart Sounds: S1: normal  S2: normal  S3: absent   S4: absent  Opening Snap: absent    Pericardial Rub:  Absent: .    Abdomen:   Appearance: normal .  Palpation: Soft, non-tender to palpation, bowel sounds positive in all four quadrants; no guarding or rebound tenderness  Extremity: no edema.   LE Skin: no rashes  LE Hair:  normal  LE Pulses: well perfused with normal pulses in the distal extremities  Pallor on elevation: Absent. Rubor on dependency: None      DATA REVIEWED:     EKG. I personally reviewed and interpreted the EKG.  Normal sinus rhythm, normal EKG.    ECG/EMG Results (all)     None        ---------------------------------------------------  TTE/SHARON:    --------------------------------------------------------------------------------------------------  LABS:     The 10-year CVD risk score (Sherwin et al., 2008) is: 29%    Values used to calculate the score:      " Age: 54 years      Sex: Male      Diabetic: No      Tobacco smoker: Yes      Systolic Blood Pressure: 130 mmHg      Is BP treated: Yes      HDL Cholesterol: 36 mg/dL      Total Cholesterol: 166 mg/dL         Lab Results   Component Value Date    GLUCOSE 103 (H) 03/24/2020    BUN 13 10/27/2021    CREATININE 0.7 10/27/2021    EGFRIFNONA 119 03/24/2020    EGFRIFAFRI 141 10/27/2021    BCR 30.4 (H) 03/24/2020    K 4.4 10/27/2021    CO2 29 10/27/2021    CALCIUM 9.1 10/27/2021    ALBUMIN 3.8 04/23/2018    AST 18 04/23/2018    ALT 35 04/23/2018     Lab Results   Component Value Date    WBC 7.86 03/24/2020    HGB 15.1 03/24/2020    HCT 44.1 03/24/2020    MCV 85.6 03/24/2020     03/24/2020     Lab Results   Component Value Date    CHOL 197 04/26/2021    CHLPL 160 04/23/2020    TRIG 102 04/26/2021    HDL 47 04/26/2021     (H) 04/26/2021     Lab Results   Component Value Date    TSH 2.25 04/24/2019     No results found for: CKTOTAL, CKMB, CKMBINDEX, TROPONINI, TROPONINT  No results found for: HGBA1C  No results found for: DDIMER  Lab Results   Component Value Date    ALT 35 04/23/2018     No results found for: HGBA1C  Lab Results   Component Value Date    CREATININE 0.7 10/27/2021     No results found for: IRON, TIBC, FERRITIN  Lab Results   Component Value Date    INR 1.05 03/24/2020    PROTIME 13.5 03/24/2020       Assessment/Plan     1. Chest pain,: Resolved  His chest pain is rather atypical in character however his recent CT showed moderate calcification his LAD and left circumflex, he has risk factors of hypertension, hyperlipidemia, tobacco use as well as family history.  Stress test was with a small ischemia, subsequent cardiac cath with nonobstructive disease.  Continue risk factor modification for prior prevention.    2. Hyperlipemia:  With his ASCVD score he qualifies for high intensity statin.  I uptitrated his Lipitor to 20 mg on last visit.  He is following with his primary care physician for blood  work next week.  Would recommend uptitrating Lipitor to 40 mg.  Target LDL less than 70.    3. Hypertension, essential  Well-controlled on amlodipine 5 mg, irbesartan/hydrochlorothiazide.    Prevention:  Patient's Body mass index is 26.05 kg/m². BMI is above normal parameters. Recommendations include: exercise counseling and nutrition counseling.      Tyree Phelan  reports that he has been smoking cigarettes. He has a 45.00 pack-year smoking history. He has never used smokeless tobacco.. I have educated him on the risk of diseases from using tobacco products such as cancer, COPD and heart diease.     I advised him to quit and he is willing to quit. We have discussed the following method/s for tobacco cessation:  Education Material Counseling.  I spent 5 minutes counseling the patient.           AAA Screening:   Not needed      No follow-ups on file.          This document has been electronically signed by Luz Maria Jones MD on November 24, 2021 11:15 CST

## 2022-11-18 ENCOUNTER — OFFICE VISIT (OUTPATIENT)
Dept: CARDIOLOGY | Facility: CLINIC | Age: 57
End: 2022-11-18

## 2022-11-18 VITALS
SYSTOLIC BLOOD PRESSURE: 144 MMHG | DIASTOLIC BLOOD PRESSURE: 76 MMHG | HEIGHT: 69 IN | WEIGHT: 158.1 LBS | OXYGEN SATURATION: 99 % | HEART RATE: 81 BPM | BODY MASS INDEX: 23.42 KG/M2

## 2022-11-18 DIAGNOSIS — I25.10 CORONARY ARTERY DISEASE INVOLVING NATIVE CORONARY ARTERY OF NATIVE HEART WITHOUT ANGINA PECTORIS: Primary | ICD-10-CM

## 2022-11-18 PROCEDURE — 99214 OFFICE O/P EST MOD 30 MIN: CPT | Performed by: INTERNAL MEDICINE

## 2022-11-18 PROCEDURE — 93000 ELECTROCARDIOGRAM COMPLETE: CPT | Performed by: INTERNAL MEDICINE

## 2022-11-18 RX ORDER — LOSARTAN POTASSIUM 100 MG/1
100 TABLET ORAL DAILY
COMMUNITY
Start: 2022-10-02 | End: 2023-10-03

## 2022-11-18 NOTE — PROGRESS NOTES
[facility]  OFFICE NOTE    Cardiovascular Medicine  Luz Maria Jones M.D., RPVI         No referring provider defined for this encounter.    Thank you for asking me to see Tyree Phelan for Chest pain.    This is a 57 y.o. male with:    1.  Hypertension  2.  Hyperlipidemia  3.  Active tobacco use  4.  Coronary artery calcification    Tyree Phelan is a 57 y.o. male who presents for consultation today.  Patient has multiple risk factors of hypertension, hyperlipidemia, active tobacco use, significant family history of premature coronary artery disease who recently had a lungs cancer screening CT done which showed calcification and plaque in his LAD and left circumflex and is here for further evaluation.  On further inquiry patient reported he has been having intermittent discomfort in the left side of his chest, he described it as pressure in character, usually happens at rest, he would last for several seconds and then goes away on its own.  Is happening about 2-3 times per week.  He is pretty active at baseline is working full-time and is on his feet climbing flights of stairs and he did not report any chest pain or shortness of breath with those activities though.  He does not have any personal history of coronary artery disease though but he has significant history of premature coronary artery disease in his siblings as well as his parents  He still smoking a pack and 1/2/day.  No other acute complaints  He is denying any orthopnea, palpitations, PND, lower extremity swelling.    07/22/2020:  Since last visit patient underwent a nuclear stress test was concerning for small ischemia inferior wall, subsequent cardiac cath was without significant obstructive coronary artery disease.  He has done well since last visit.  No further episodes of chest pain.  He continues to smoke though.    No acute issues since last year.  Continues to smoke.  Denying any chest pain.  Wife is  diagnosed with lung cancer and unfortunately has his daughter .    2022:  Has done well since last visit.  Denying any chest pain or shortness of breath.        Review of Systems - ROS  Constitution: Negative for weakness, weight gain and weight loss.   HENT: Negative for congestion.    Eyes: Negative for blurred vision.   Cardiovascular: As mentioned above  Respiratory: Negative for cough and hemoptysis.    Endocrine: Negative for polydipsia and polyuria.   Hematologic/Lymphatic: Negative for bleeding problem. Does not bruise/bleed easily.   Skin: Negative for flushing.   Musculoskeletal: Negative for neck pain and stiffness.   Gastrointestinal: Negative for abdominal pain, diarrhea, jaundice, melena, nausea and vomiting.   Genitourinary: Negative for dysuria and hematuria.   Neurological: Negative for dizziness, focal weakness and numbness.   Psychiatric/Behavioral: Negative for altered mental status and depression.     I reviewed the ROS as documented here and confirmed the accuracy of it with the patient today. 2022        All other systems were reviewed and were negative.    family history includes Diabetes in his mother; Heart disease in his father.     reports that he has been smoking cigarettes. He has a 45.00 pack-year smoking history. He has never used smokeless tobacco. He reports that he does not currently use alcohol. He reports that he does not use drugs.    Allergies   Allergen Reactions   • Codeine Itching     itch         Current Outpatient Medications:   •  amLODIPine (NORVASC) 5 MG tablet, Take 5 mg by mouth Every Evening., Disp: , Rfl:   •  atorvastatin (LIPITOR) 40 MG tablet, Take 1 tablet by mouth Daily., Disp: 90 tablet, Rfl: 3  •  losartan (COZAAR) 100 MG tablet, Take 100 mg by mouth Daily., Disp: , Rfl:   •  sildenafil (VIAGRA) 100 MG tablet, Take 100 mg by mouth As Needed (as needed for gout)., Disp: , Rfl:   •  tiZANidine (ZANAFLEX) 4 MG tablet, TAKE 1 2 TO 1 (ONE HALF TO  "ONE) TABLET BY MOUTH NIGHTLY FOR 2 WEEKS MAX, Disp: , Rfl:   •  lansoprazole (PREVACID) 30 MG capsule, Take 30 mg by mouth Daily., Disp: , Rfl:     Physical Exam:  Vitals:    11/18/22 0934   BP: 144/76   BP Location: Left arm   Patient Position: Sitting   Cuff Size: Adult   Pulse: 81   SpO2: 99%   Weight: 71.7 kg (158 lb 1.6 oz)   Height: 175.3 cm (69\")   PainSc: 0-No pain   PainLoc: Chest     Current Pain Level: none  Pulse Ox: Normal  on room air  General: alert, appears stated age and cooperative     Body Habitus: well-nourished    HEENT: Head: Normocephalic, no lesions, without obvious abnormality. No arcus senilis, xanthelasma or xanthomas.    Neuro: alert, oriented x3  Pulses: 2+ and symmetric  JVP: Volume/Pulsation: Normal.  Normal waveforms.   Appropriate inspiratory decrease.  No Kussmaul's. No Herb's.   Carotid Exam: no bruit normal pulsation bilaterally   Carotid Volume: normal.     Respirations: no increased work of breathing   Chest:  Normal    Pulmonary:Normal   Precordium: Normal impulses. P2 is not palpable.  RV Heave: absent  LV Heave: absent  [place]:  normal size and placement  Palpable S4: absent.  Heart rate: normal    Heart Rhythm: regular     Heart Sounds: S1: normal  S2: normal  S3: absent   S4: absent  Opening Snap: absent    Pericardial Rub:  Absent: .    Abdomen:   Appearance: normal .  Palpation: Soft, non-tender to palpation, bowel sounds positive in all four quadrants; no guarding or rebound tenderness  Extremity: no edema.   LE Skin: no rashes  LE Hair:  normal  LE Pulses: well perfused with normal pulses in the distal extremities  Pallor on elevation: Absent. Rubor on dependency: None    I have reexamined the patient and the results are consistent with the previously documented exam. Luz Maria Jones MD       DATA REVIEWED:     EKG. I personally reviewed and interpreted the EKG.  Normal sinus rhythm, normal EKG.    ECG/EMG Results (all)     None    "     ---------------------------------------------------  TTE/SHARON:    --------------------------------------------------------------------------------------------------  LABS:     The 10-year CVD risk score (Sherwin et al., 2008) is: 29%    Values used to calculate the score:      Age: 54 years      Sex: Male      Diabetic: No      Tobacco smoker: Yes      Systolic Blood Pressure: 130 mmHg      Is BP treated: Yes      HDL Cholesterol: 36 mg/dL      Total Cholesterol: 166 mg/dL         Lab Results   Component Value Date    GLUCOSE 103 (H) 03/24/2020    BUN 20 10/27/2022    CREATININE 0.8 10/27/2022    EGFRIFNONA 119 03/24/2020    EGFRIFAFRI 141 10/27/2021    BCR 30.4 (H) 03/24/2020    K 4.0 10/27/2022    CO2 27 10/27/2022    CALCIUM 8.4 (L) 10/27/2022    ALBUMIN 4.0 04/30/2022    AST 23 04/30/2022    ALT 30 04/30/2022     Lab Results   Component Value Date    WBC 7.86 03/24/2020    HGB 15.1 03/24/2020    HCT 44.1 03/24/2020    MCV 85.6 03/24/2020     03/24/2020     Lab Results   Component Value Date    CHOL 197 04/26/2021    CHLPL 140 04/30/2022    TRIG 80 04/30/2022    HDL 59 04/30/2022    LDL 65 04/30/2022     Lab Results   Component Value Date    TSH 2.25 04/24/2019     No results found for: CKTOTAL, CKMB, CKMBINDEX, TROPONINI, TROPONINT  No results found for: HGBA1C  No results found for: DDIMER  Lab Results   Component Value Date    ALT 30 04/30/2022     No results found for: HGBA1C  Lab Results   Component Value Date    CREATININE 0.8 10/27/2022     No results found for: IRON, TIBC, FERRITIN  Lab Results   Component Value Date    INR 1.05 03/24/2020    PROTIME 13.5 03/24/2020       Assessment/Plan     1. Chest pain,: Resolved  His chest pain is rather atypical in character however his recent CT showed moderate calcification his LAD and left circumflex, he has risk factors of hypertension, hyperlipidemia, tobacco use as well as family history.  Stress test was with a small ischemia, subsequent cardiac  cath with nonobstructive disease.  Continue risk factor modification for prior prevention.    2. Hyperlipemia:  With his ASCVD score he qualifies for high intensity statin.  I uptitrated his Lipitor to 40 mg.  LDL is improved to 65.  Continue current dose.    3. Hypertension, essential  Well-controlled on amlodipine 5 mg and losartan 100 mg.    Prevention:  Patient's Body mass index is 23.35 kg/m². BMI is above normal parameters. Recommendations include: exercise counseling and nutrition counseling.      Tyree Phelan  reports that he has been smoking cigarettes. He has a 45.00 pack-year smoking history. He has never used smokeless tobacco.. I have educated him on the risk of diseases from using tobacco products such as cancer, COPD and heart diease.     I advised him to quit and he is willing to quit. We have discussed the following method/s for tobacco cessation:  Education Material Counseling.  I spent 5 minutes counseling the patient.           AAA Screening:   Not needed            This document has been electronically signed by Luz Maria Jones MD on November 18, 2022 09:39 CST

## 2022-12-01 LAB
QT INTERVAL: 342 MS
QTC INTERVAL: 397 MS

## (undated) DEVICE — CATH DIAG EXPO .052 PIG145 6F 110CM

## (undated) DEVICE — RADIFOCUS OPTITORQUE ANGIOGRAPHIC CATHETER: Brand: OPTITORQUE

## (undated) DEVICE — GW PERIPH GUIDERIGHT STD/EXCHNG/J/TIP SS 0.038IN 5X260CM

## (undated) DEVICE — CANN SMPL SOFTECH BIFLO ETCO2 A/M 7FT

## (undated) DEVICE — GLIDESHEATH SLENDER STAINLESS STEEL KIT: Brand: GLIDESHEATH SLENDER

## (undated) DEVICE — ELECTRODE,RT,STRESS,FOAM,50PK: Brand: MEDLINE

## (undated) DEVICE — PK CATH LAB 60

## (undated) DEVICE — MODEL BT2000 P/N 700287-012KIT CONTENTS: MANIFOLD WITH SALINE AND CONTRAST PORTS, SALINE TUBING WITH SPIKE AND HAND SYRINGE, TRANSDUCER: Brand: BT2000 AUTOMATED MANIFOLD KIT

## (undated) DEVICE — A2000 MULTI-USE SYRINGE KIT, P/N 701277-003KIT CONTENTS: 100ML CONTRAST RESERVOIR AND TUBING WITH CONTRAST SPIKE AND CLAMP: Brand: A2000 MULTI-USE SYRINGE KIT

## (undated) DEVICE — X-DRAPE ABS 12"X17" .25MM LEAD EQUIV STERILE X-RAY SHIELD 10/BOX: Brand: X-DRAPE

## (undated) DEVICE — TR BAND RADIAL ARTERY COMPRESSION DEVICE: Brand: TR BAND